# Patient Record
Sex: FEMALE | Race: WHITE | HISPANIC OR LATINO | Employment: FULL TIME | URBAN - METROPOLITAN AREA
[De-identification: names, ages, dates, MRNs, and addresses within clinical notes are randomized per-mention and may not be internally consistent; named-entity substitution may affect disease eponyms.]

---

## 2018-04-02 LAB
ABSOL LYMPHOCYTES (HISTORICAL): 3.5 K/UL (ref 0.5–4)
BASOPHILS # BLD AUTO: 0.1 K/UL (ref 0–0.1)
BASOPHILS # BLD AUTO: 1 % (ref 0–1)
COMMENT (HISTORICAL): ABNORMAL
DEPRECATED RDW RBC AUTO: 15.8 %
EOSINOPHIL # BLD AUTO: 0.1 K/UL (ref 0–0.4)
EOSINOPHIL NFR BLD AUTO: 1 % (ref 0–6)
ERYTHROCYTE SEDIMENTATION RATE (HISTORICAL): 65 MM (ref 1–20)
FOLATE SERPL-MCNC: 17.9 NG/ML
HCT VFR BLD AUTO: 36.2 % (ref 36–46)
HGB BLD-MCNC: 11.6 G/DL (ref 12–16)
LYMPHOCYTES NFR BLD AUTO: 36 % (ref 25–45)
MCH RBC QN AUTO: 21.8 PG (ref 26–34)
MCHC RBC AUTO-ENTMCNC: 31.9 % (ref 31–36)
MCV RBC AUTO: 68 FL (ref 80–100)
MONOCYTES # BLD AUTO: 0.6 K/UL (ref 0.2–0.9)
MONOCYTES NFR BLD AUTO: 6 % (ref 1–10)
NEUTROPHILS ABS COUNT (HISTORICAL): 5.4 K/UL (ref 1.8–7.8)
NEUTS SEG NFR BLD AUTO: 56 % (ref 45–65)
PLATELET # BLD AUTO: 325 K/MCL (ref 150–450)
RBC # BLD AUTO: 5.29 M/MCL (ref 4–5.2)
RBC MORPHOLOGY (HISTORICAL): ABNORMAL
VIT B12 SERPL-MCNC: 429 PG/ML (ref 239–931)
WBC # BLD AUTO: 9.7 K/MCL (ref 4.5–11)

## 2018-04-04 LAB — RHEUMATOID FACTOR (HISTORICAL): <8.6 IU/ML

## 2018-08-26 PROBLEM — E66.09 CLASS 2 OBESITY DUE TO EXCESS CALORIES WITHOUT SERIOUS COMORBIDITY WITH BODY MASS INDEX (BMI) OF 38.0 TO 38.9 IN ADULT: Status: ACTIVE | Noted: 2018-08-26

## 2018-08-26 NOTE — ASSESSMENT & PLAN NOTE
210 to 202 since previous appointment  Patient counseled on benefits of healthy diet and exercise  Educated on comorbidities and risk factors of obesity

## 2018-08-27 ENCOUNTER — OFFICE VISIT (OUTPATIENT)
Dept: FAMILY MEDICINE CLINIC | Facility: CLINIC | Age: 28
End: 2018-08-27
Payer: COMMERCIAL

## 2018-08-27 VITALS
DIASTOLIC BLOOD PRESSURE: 82 MMHG | HEIGHT: 61 IN | RESPIRATION RATE: 16 BRPM | TEMPERATURE: 96.7 F | WEIGHT: 202 LBS | OXYGEN SATURATION: 99 % | BODY MASS INDEX: 38.14 KG/M2 | SYSTOLIC BLOOD PRESSURE: 126 MMHG | HEART RATE: 85 BPM

## 2018-08-27 DIAGNOSIS — M62.838 MUSCLE SPASM: ICD-10-CM

## 2018-08-27 DIAGNOSIS — E66.09 CLASS 2 OBESITY DUE TO EXCESS CALORIES WITHOUT SERIOUS COMORBIDITY WITH BODY MASS INDEX (BMI) OF 38.0 TO 38.9 IN ADULT: ICD-10-CM

## 2018-08-27 DIAGNOSIS — Z23 ENCOUNTER FOR IMMUNIZATION: ICD-10-CM

## 2018-08-27 DIAGNOSIS — G43.119 INTRACTABLE MIGRAINE WITH AURA WITHOUT STATUS MIGRAINOSUS: Primary | ICD-10-CM

## 2018-08-27 PROCEDURE — 99213 OFFICE O/P EST LOW 20 MIN: CPT | Performed by: FAMILY MEDICINE

## 2018-08-27 PROCEDURE — 90714 TD VACC NO PRESV 7 YRS+ IM: CPT | Performed by: FAMILY MEDICINE

## 2018-08-27 PROCEDURE — 90471 IMMUNIZATION ADMIN: CPT | Performed by: FAMILY MEDICINE

## 2018-08-27 PROCEDURE — 3008F BODY MASS INDEX DOCD: CPT | Performed by: FAMILY MEDICINE

## 2018-08-27 PROCEDURE — 3725F SCREEN DEPRESSION PERFORMED: CPT | Performed by: FAMILY MEDICINE

## 2018-08-27 RX ORDER — METHOCARBAMOL 500 MG/1
500 TABLET, FILM COATED ORAL 4 TIMES DAILY PRN
Qty: 120 TABLET | Refills: 0 | Status: SHIPPED | OUTPATIENT
Start: 2018-08-27

## 2018-08-27 RX ORDER — SUMATRIPTAN 25 MG/1
25 TABLET, FILM COATED ORAL DAILY PRN
Qty: 30 TABLET | Refills: 0 | Status: SHIPPED | OUTPATIENT
Start: 2018-08-27 | End: 2018-10-22 | Stop reason: DRUGHIGH

## 2018-08-27 RX ORDER — SUMATRIPTAN 25 MG/1
25 TABLET, FILM COATED ORAL DAILY PRN
COMMUNITY
Start: 2018-04-02 | End: 2018-08-27 | Stop reason: SDUPTHER

## 2018-08-27 RX ORDER — VERAPAMIL HYDROCHLORIDE 80 MG/1
80 TABLET ORAL 3 TIMES DAILY
Qty: 90 TABLET | Refills: 1 | Status: SHIPPED | OUTPATIENT
Start: 2018-08-27

## 2018-08-27 NOTE — PROGRESS NOTES
Assessment/Plan:    Class 2 obesity due to excess calories without serious comorbidity with body mass index (BMI) of 38 0 to 38 9 in adult  210 to 202 since previous appointment  Patient counseled on benefits of healthy diet and exercise  Educated on comorbidities and risk factors of obesity     Intractable migraine with aura without status migrainosus  Patient brought headache journal into office, she has logged headaches on nearly a daily basis with the worst requiring imitrex 3-4 times per week  Headache is posterior and associated with aura, radiates up the back of the neck  Likely secondary to muscle spasm/tension across the shoulders  Of note, patient has large breasts that have caused chronic back and neck problems throughout her life  Will start on verapamil 80mg TID for prophylaxis       Diagnoses and all orders for this visit:    Intractable migraine with aura without status migrainosus  -     verapamil (CALAN) 80 mg tablet; Take 1 tablet (80 mg total) by mouth 3 (three) times a day  -     SUMAtriptan (IMITREX) 25 mg tablet; Take 1 tablet (25 mg total) by mouth daily as needed for migraine    Encounter for immunization  -     TD VACCINE GREATER THAN OR EQUAL TO 8YO PRESERVATIVE FREE IM    Class 2 obesity due to excess calories without serious comorbidity with body mass index (BMI) of 38 0 to 38 9 in adult    Muscle spasm  -     methocarbamol (ROBAXIN) 500 mg tablet; Take 1 tablet (500 mg total) by mouth 4 (four) times a day as needed for muscle spasms    Other orders  -     Discontinue: SUMAtriptan (IMITREX) 25 mg tablet; Take 25 mg by mouth daily as needed        Subjective:      Patient ID: Justino South is a 25 y o  female  This is a 26 yo who comes in today for follow up of her chronic and recurrent migraine headaches  She reports continued headaches on a daily basis which progress to migraines 3-4 times per week requiring her sumatriptan for successful relief    Bad headaches are still associated with visual aura, and occasional nausea and sensitivity to light and sound  Is open to being started on daily medication for prophylaxis vs just medication to abort headaches  The following portions of the patient's history were reviewed and updated as appropriate: allergies, current medications, past family history, past medical history, past social history, past surgical history and problem list     Review of Systems   Constitutional: Negative for appetite change, chills, fatigue, fever and unexpected weight change  HENT: Negative for congestion, ear pain, rhinorrhea, sinus pain, sinus pressure and sore throat  Eyes: Negative for visual disturbance  Respiratory: Negative for cough, chest tightness, shortness of breath and wheezing  Cardiovascular: Negative for chest pain, palpitations and leg swelling  Gastrointestinal: Negative for abdominal pain, constipation, diarrhea, nausea and vomiting  Genitourinary: Negative for dysuria and flank pain  Musculoskeletal: Positive for back pain and neck pain  Negative for arthralgias and myalgias  Skin: Negative for rash  Allergic/Immunologic: Negative for environmental allergies  Neurological: Negative for dizziness and light-headedness  Psychiatric/Behavioral: Negative for suicidal ideas  Objective:      /82 (BP Location: Left arm, Patient Position: Sitting, Cuff Size: Adult)   Pulse 85   Temp (!) 96 7 °F (35 9 °C) (Temporal)   Resp 16   Ht 5' 1" (1 549 m)   Wt 91 6 kg (202 lb)   SpO2 99%   BMI 38 17 kg/m²          Physical Exam   Constitutional: She is oriented to person, place, and time  She appears well-developed and well-nourished  No distress  HENT:   Head: Normocephalic and atraumatic  Eyes: Pupils are equal, round, and reactive to light  Neck: Normal range of motion  Muscular tenderness present  Cardiovascular: Normal rate and normal heart sounds  No murmur heard    Pulmonary/Chest: Effort normal  She has no wheezes  Abdominal: Soft  There is no tenderness  Musculoskeletal: Normal range of motion  She exhibits no edema or tenderness  Neurological: She is alert and oriented to person, place, and time  Skin: Skin is warm and dry  No rash noted  Psychiatric: She has a normal mood and affect   Her behavior is normal

## 2018-08-27 NOTE — PATIENT INSTRUCTIONS
Acute Headache   WHAT YOU NEED TO KNOW:   An acute headache is pain or discomfort that starts suddenly and gets worse quickly  You may have an acute headache only when you feel stress or eat certain foods  Other acute headache pain can happen every day, and sometimes several times a day  DISCHARGE INSTRUCTIONS:   Return to the emergency department if:   · You have severe pain  · You have numbness or weakness on one side of your face or body  · You have a headache that occurs after a blow to the head, a fall, or other trauma  · You have a headache, are forgetful or confused, or have trouble speaking  · You have a headache, stiff neck, and a fever  Contact your healthcare provider if:   · You have a constant headache and are vomiting  · You have a headache each day that does not get better, even after treatment  · You have changes in your headaches, or new symptoms that occur when you have a headache  · You have questions or concerns about your condition or care  Medicines: You may need any of the following:  · Prescription pain medicine  may be given  The medicine your healthcare provider recommends will depend on the kind of headaches you have  You will need to take prescription headache medicines as directed to prevent a problem called rebound headache  These headaches happen with regular use of pain relievers for headache disorders  · NSAIDs , such as ibuprofen, help decrease swelling, pain, and fever  This medicine is available with or without a doctor's order  NSAIDs can cause stomach bleeding or kidney problems in certain people  If you take blood thinner medicine, always ask your healthcare provider if NSAIDs are safe for you  Always read the medicine label and follow directions  · Acetaminophen  decreases pain and fever  It is available without a doctor's order  Ask how much to take and how often to take it  Follow directions   Read the labels of all other medicines you are using to see if they also contain acetaminophen, or ask your doctor or pharmacist  Acetaminophen can cause liver damage if not taken correctly  Do not use more than 3 grams (3,000 milligrams) total of acetaminophen in one day  · Antidepressants  may be given for some kinds of headaches  · Take your medicine as directed  Contact your healthcare provider if you think your medicine is not helping or if you have side effects  Tell him or her if you are allergic to any medicine  Keep a list of the medicines, vitamins, and herbs you take  Include the amounts, and when and why you take them  Bring the list or the pill bottles to follow-up visits  Carry your medicine list with you in case of an emergency  Manage your symptoms:   · Apply heat or ice  on the headache area  Use a heat or ice pack  For an ice pack, you can also put crushed ice in a plastic bag  Cover the pack or bag with a towel before you apply it to your skin  Ice and heat both help decrease pain, and heat also helps decrease muscle spasms  Apply heat for 20 to 30 minutes every 2 hours  Apply ice for 15 to 20 minutes every hour  Apply heat or ice for as long and for as many days as directed  You may alternate heat and ice  · Relax your muscles  Lie down in a comfortable position and close your eyes  Relax your muscles slowly  Start at your toes and work your way up your body  · Keep a record of your headaches  Write down when your headaches start and stop  Include your symptoms and what you were doing when the headache began  Record what you ate or drank for 24 hours before the headache started  Describe the pain and where it hurts  Keep track of what you did to treat your headache and if it worked  Prevent an acute headache:   · Avoid anything that triggers an acute headache  Examples include exposure to chemicals, going to high altitude, or not getting enough sleep  Create a regular sleep routine   Go to sleep at the same time and wake up at the same time each day  Do not use electronic devices before bedtime  These may trigger a headache or prevent you from sleeping well  · Do not smoke  Nicotine and other chemicals in cigarettes and cigars can trigger an acute headache or make it worse  Ask your healthcare provider for information if you currently smoke and need help to quit  E-cigarettes or smokeless tobacco still contain nicotine  Talk to your healthcare provider before you use these products  · Limit alcohol as directed  Alcohol can trigger an acute headache or make it worse  If you have cluster headaches, do not drink alcohol during an episode  For other types of headaches, ask your healthcare provider if it is safe for you to drink alcohol  Ask how much is safe for you to drink, and how often  · Exercise as directed  Exercise can reduce tension and help with headache pain  Aim for 30 minutes of physical activity on most days of the week  Your healthcare provider can help you create an exercise plan  · Eat a variety of healthy foods  Healthy foods include fruits, vegetables, low-fat dairy products, lean meats, fish, whole grains, and cooked beans  Your healthcare provider or dietitian can help you create meals plans if you need to avoid foods that trigger headaches  Follow up with your healthcare provider as directed:  Bring your headache record with you when you see your healthcare provider  Write down your questions so you remember to ask them during your visits  © 2017 [employer] Information is for End User's use only and may not be sold, redistributed or otherwise used for commercial purposes  All illustrations and images included in CareNotes® are the copyrighted property of A D [employer]  or [employer]  The above information is an  only  It is not intended as medical advice for individual conditions or treatments   Talk to your doctor, nurse or pharmacist before following any medical regimen to see if it is safe and effective for you

## 2018-08-27 NOTE — ASSESSMENT & PLAN NOTE
Patient brought headache journal into office, she has logged headaches on nearly a daily basis with the worst requiring imitrex 3-4 times per week  Headache is posterior and associated with aura, radiates up the back of the neck  Likely secondary to muscle spasm/tension across the shoulders  Of note, patient has large breasts that have caused chronic back and neck problems throughout her life  Will start on verapamil 80mg TID for prophylaxis

## 2018-09-17 ENCOUNTER — OFFICE VISIT (OUTPATIENT)
Dept: FAMILY MEDICINE CLINIC | Facility: CLINIC | Age: 28
End: 2018-09-17
Payer: COMMERCIAL

## 2018-09-17 VITALS
DIASTOLIC BLOOD PRESSURE: 80 MMHG | TEMPERATURE: 96.6 F | WEIGHT: 203 LBS | BODY MASS INDEX: 38.36 KG/M2 | OXYGEN SATURATION: 96 % | RESPIRATION RATE: 19 BRPM | SYSTOLIC BLOOD PRESSURE: 110 MMHG | HEART RATE: 86 BPM

## 2018-09-17 DIAGNOSIS — G47.00 INSOMNIA, UNSPECIFIED TYPE: ICD-10-CM

## 2018-09-17 DIAGNOSIS — G43.119 INTRACTABLE MIGRAINE WITH AURA WITHOUT STATUS MIGRAINOSUS: Primary | ICD-10-CM

## 2018-09-17 DIAGNOSIS — E66.09 CLASS 2 OBESITY DUE TO EXCESS CALORIES WITHOUT SERIOUS COMORBIDITY WITH BODY MASS INDEX (BMI) OF 38.0 TO 38.9 IN ADULT: ICD-10-CM

## 2018-09-17 PROCEDURE — 99213 OFFICE O/P EST LOW 20 MIN: CPT | Performed by: INTERNAL MEDICINE

## 2018-09-17 RX ORDER — TRAZODONE HYDROCHLORIDE 50 MG/1
50 TABLET ORAL
Qty: 30 TABLET | Refills: 1 | Status: SHIPPED | OUTPATIENT
Start: 2018-09-17

## 2018-09-17 NOTE — ASSESSMENT & PLAN NOTE
Patient reports that she had recent viral illness with nausea, cough, runny nose, and fever  Due to her viral illness, she has not been taking the verapamil for the past several days after taking 3 days consecutively when she first picked up the prescription  Patient was advised to restart her verapamil as prescribed before, 80 mg t i d    Also discussed the possibility of sleep apnea contributing to her headaches and in the absence of any previous sleep study being performed, one was ordered today  Advised patient to continue her headache journal as she restarts her verapamil and we will follow her up in 1 month

## 2018-09-17 NOTE — ASSESSMENT & PLAN NOTE
Weight has remained consistent since previous visit  Counseled on benefits of healthy diet and exercise  Educated on comorbidities and risk factors of obesity  Sleep study ordered

## 2018-09-17 NOTE — PROGRESS NOTES
Assessment/Plan:    Intractable migraine with aura without status migrainosus  Patient reports that she had recent viral illness with nausea, cough, runny nose, and fever  Due to her viral illness, she has not been taking the verapamil for the past several days after taking 3 days consecutively when she first picked up the prescription  Patient was advised to restart her verapamil as prescribed before, 80 mg t i d  Also discussed the possibility of sleep apnea contributing to her headaches and in the absence of any previous sleep study being performed, one was ordered today  Advised patient to continue her headache journal as she restarts her verapamil and we will follow her up in 1 month    Class 2 obesity due to excess calories without serious comorbidity with body mass index (BMI) of 38 0 to 38 9 in adult   Weight has remained consistent since previous visit  Counseled on benefits of healthy diet and exercise  Educated on comorbidities and risk factors of obesity  Sleep study ordered    Insomnia   Sleep study ordered  Discussed extensively the benefits of good sleep hygiene however 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  Advised patient to start with melatonin 1 mg 1-2 hours prior to anticipated sleep time  Advised no caffeine prior to bed  Advised no TV in the bedroom and no interaction with her electronic sources prior to sleep  Also prescribed trazodone 50 mg q h s  To be taken p r n  If difficulty initiating sleep persists       Diagnoses and all orders for this visit:    Intractable migraine with aura without status migrainosus    Class 2 obesity due to excess calories without serious comorbidity with body mass index (BMI) of 38 0 to 38 9 in adult    Insomnia, unspecified type  -     traZODone (DESYREL) 50 mg tablet; Take 1 tablet (50 mg total) by mouth daily at bedtime  -     Diagnostic Sleep Study;  Future          Subjective:      Patient ID: Jason Cross is a 25 y o  female  This is a 64-year-old female who comes into our office today for follow-up of her migraines  At her previous appointment she was started on verapamil 80 mg t i d  For headache prophylaxis  Unfortunately, she ended up coming down with a viral infection a few days after starting her verapamil and had to stop as it was causing her nausea while she had symptoms of her viral infection  She plans to reinitiate therapy with verapamil 80 mg t i d         The following portions of the patient's history were reviewed and updated as appropriate: allergies, current medications, past family history, past medical history, past social history, past surgical history and problem list     Review of Systems   Constitutional: Negative for appetite change, chills, fatigue, fever and unexpected weight change  HENT: Negative for congestion, ear pain, rhinorrhea, sinus pain, sinus pressure and sore throat  Eyes: Negative for visual disturbance  Respiratory: Negative for cough, chest tightness, shortness of breath and wheezing  Cardiovascular: Negative for chest pain, palpitations and leg swelling  Gastrointestinal: Positive for nausea  Negative for abdominal pain, constipation, diarrhea and vomiting  Genitourinary: Negative for dysuria and flank pain  Musculoskeletal: Negative for arthralgias, back pain, myalgias and neck pain  Skin: Negative for rash  Neurological: Positive for headaches  Negative for dizziness and light-headedness  Psychiatric/Behavioral: Negative for suicidal ideas  Objective:      /80 (BP Location: Left arm, Patient Position: Sitting, Cuff Size: Adult)   Pulse 86   Temp (!) 96 6 °F (35 9 °C) (Temporal)   Resp 19   Wt 92 1 kg (203 lb)   SpO2 96%   BMI 38 36 kg/m²          Physical Exam   Constitutional: She is oriented to person, place, and time  She appears well-developed and well-nourished  No distress     HENT:   Head: Normocephalic and atraumatic  Eyes: Pupils are equal, round, and reactive to light  Neck: Normal range of motion  Musculoskeletal: She exhibits no edema or tenderness  Neurological: She is alert and oriented to person, place, and time  Skin: Skin is warm and dry  No rash noted  Psychiatric: She has a normal mood and affect   Her behavior is normal

## 2018-09-17 NOTE — ASSESSMENT & PLAN NOTE
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

## 2018-10-09 ENCOUNTER — TELEPHONE (OUTPATIENT)
Dept: SLEEP CENTER | Facility: CLINIC | Age: 28
End: 2018-10-09

## 2018-10-09 NOTE — TELEPHONE ENCOUNTER
----- Message from Brittany Sanches DO sent at 10/8/2018  4:49 PM EDT -----  Chart reviewed  Study approved   ----- Message -----  From: Torsten Lynn  Sent: 10/8/2018   9:53 AM  To: Brittany Sanches, DO    Please review for approval or denial  Thank you     ----- Message -----  From: Brittany Sanches DO  Sent: 10/5/2018   3:45 PM  To: [facility]        ----- Message -----  From: Torsten Lynn  Sent: 10/5/2018  11:04 AM  To: Brittany Sanches, DO    This sleep study needs approval      If approved please sign and return to clerical pool  If denied please include reasons why  Also provide alternative testing if warranted  Please sign and return to clerical pool

## 2018-10-22 ENCOUNTER — OFFICE VISIT (OUTPATIENT)
Dept: FAMILY MEDICINE CLINIC | Facility: CLINIC | Age: 28
End: 2018-10-22
Payer: COMMERCIAL

## 2018-10-22 VITALS
RESPIRATION RATE: 14 BRPM | BODY MASS INDEX: 37.98 KG/M2 | SYSTOLIC BLOOD PRESSURE: 102 MMHG | TEMPERATURE: 96 F | OXYGEN SATURATION: 98 % | WEIGHT: 201 LBS | HEART RATE: 76 BPM | DIASTOLIC BLOOD PRESSURE: 78 MMHG

## 2018-10-22 DIAGNOSIS — G47.00 INSOMNIA, UNSPECIFIED TYPE: ICD-10-CM

## 2018-10-22 DIAGNOSIS — Z23 NEED FOR VACCINATION: ICD-10-CM

## 2018-10-22 DIAGNOSIS — E66.09 CLASS 2 OBESITY DUE TO EXCESS CALORIES WITHOUT SERIOUS COMORBIDITY WITH BODY MASS INDEX (BMI) OF 38.0 TO 38.9 IN ADULT: ICD-10-CM

## 2018-10-22 DIAGNOSIS — G43.119 INTRACTABLE MIGRAINE WITH AURA WITHOUT STATUS MIGRAINOSUS: Primary | ICD-10-CM

## 2018-10-22 PROCEDURE — 90686 IIV4 VACC NO PRSV 0.5 ML IM: CPT | Performed by: FAMILY MEDICINE

## 2018-10-22 PROCEDURE — 99213 OFFICE O/P EST LOW 20 MIN: CPT | Performed by: FAMILY MEDICINE

## 2018-10-22 PROCEDURE — 90471 IMMUNIZATION ADMIN: CPT | Performed by: FAMILY MEDICINE

## 2018-10-22 RX ORDER — SUMATRIPTAN 50 MG/1
50 TABLET, FILM COATED ORAL ONCE AS NEEDED
Qty: 30 TABLET | Refills: 0 | Status: SHIPPED | OUTPATIENT
Start: 2018-10-22 | End: 2018-10-24 | Stop reason: SDUPTHER

## 2018-10-22 NOTE — ASSESSMENT & PLAN NOTE
Sleep medicine contacted patient to inform her that she is scheduled for December 14th for sleep study to be performed  I still suspect that sleep apnea may be playing a role in her continued headaches, despite much improvement on her migraine medication, both verapamil and sumatriptan  Continue trazodone, advised patient she can take 100mg PRN if unable to sleep, but also advised to take sooner as she was stating that it feels like a while before it starts to work

## 2018-10-22 NOTE — ASSESSMENT & PLAN NOTE
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

## 2018-10-22 NOTE — PATIENT INSTRUCTIONS
Acute Headache   WHAT YOU NEED TO KNOW:   An acute headache is pain or discomfort that starts suddenly and gets worse quickly  You may have an acute headache only when you feel stress or eat certain foods  Other acute headache pain can happen every day, and sometimes several times a day  DISCHARGE INSTRUCTIONS:   Return to the emergency department if:   · You have severe pain  · You have numbness or weakness on one side of your face or body  · You have a headache that occurs after a blow to the head, a fall, or other trauma  · You have a headache, are forgetful or confused, or have trouble speaking  · You have a headache, stiff neck, and a fever  Contact your healthcare provider if:   · You have a constant headache and are vomiting  · You have a headache each day that does not get better, even after treatment  · You have changes in your headaches, or new symptoms that occur when you have a headache  · You have questions or concerns about your condition or care  Medicines: You may need any of the following:  · Prescription pain medicine  may be given  The medicine your healthcare provider recommends will depend on the kind of headaches you have  You will need to take prescription headache medicines as directed to prevent a problem called rebound headache  These headaches happen with regular use of pain relievers for headache disorders  · NSAIDs , such as ibuprofen, help decrease swelling, pain, and fever  This medicine is available with or without a doctor's order  NSAIDs can cause stomach bleeding or kidney problems in certain people  If you take blood thinner medicine, always ask your healthcare provider if NSAIDs are safe for you  Always read the medicine label and follow directions  · Acetaminophen  decreases pain and fever  It is available without a doctor's order  Ask how much to take and how often to take it  Follow directions   Read the labels of all other medicines you are using to see if they also contain acetaminophen, or ask your doctor or pharmacist  Acetaminophen can cause liver damage if not taken correctly  Do not use more than 3 grams (3,000 milligrams) total of acetaminophen in one day  · Antidepressants  may be given for some kinds of headaches  · Take your medicine as directed  Contact your healthcare provider if you think your medicine is not helping or if you have side effects  Tell him or her if you are allergic to any medicine  Keep a list of the medicines, vitamins, and herbs you take  Include the amounts, and when and why you take them  Bring the list or the pill bottles to follow-up visits  Carry your medicine list with you in case of an emergency  Manage your symptoms:   · Apply heat or ice  on the headache area  Use a heat or ice pack  For an ice pack, you can also put crushed ice in a plastic bag  Cover the pack or bag with a towel before you apply it to your skin  Ice and heat both help decrease pain, and heat also helps decrease muscle spasms  Apply heat for 20 to 30 minutes every 2 hours  Apply ice for 15 to 20 minutes every hour  Apply heat or ice for as long and for as many days as directed  You may alternate heat and ice  · Relax your muscles  Lie down in a comfortable position and close your eyes  Relax your muscles slowly  Start at your toes and work your way up your body  · Keep a record of your headaches  Write down when your headaches start and stop  Include your symptoms and what you were doing when the headache began  Record what you ate or drank for 24 hours before the headache started  Describe the pain and where it hurts  Keep track of what you did to treat your headache and if it worked  Prevent an acute headache:   · Avoid anything that triggers an acute headache  Examples include exposure to chemicals, going to high altitude, or not getting enough sleep  Create a regular sleep routine   Go to sleep at the same time and wake up at the same time each day  Do not use electronic devices before bedtime  These may trigger a headache or prevent you from sleeping well  · Do not smoke  Nicotine and other chemicals in cigarettes and cigars can trigger an acute headache or make it worse  Ask your healthcare provider for information if you currently smoke and need help to quit  E-cigarettes or smokeless tobacco still contain nicotine  Talk to your healthcare provider before you use these products  · Limit alcohol as directed  Alcohol can trigger an acute headache or make it worse  If you have cluster headaches, do not drink alcohol during an episode  For other types of headaches, ask your healthcare provider if it is safe for you to drink alcohol  Ask how much is safe for you to drink, and how often  · Exercise as directed  Exercise can reduce tension and help with headache pain  Aim for 30 minutes of physical activity on most days of the week  Your healthcare provider can help you create an exercise plan  · Eat a variety of healthy foods  Healthy foods include fruits, vegetables, low-fat dairy products, lean meats, fish, whole grains, and cooked beans  Your healthcare provider or dietitian can help you create meals plans if you need to avoid foods that trigger headaches  Follow up with your healthcare provider as directed:  Bring your headache record with you when you see your healthcare provider  Write down your questions so you remember to ask them during your visits  © 2017 [facility] Information is for End User's use only and may not be sold, redistributed or otherwise used for commercial purposes  All illustrations and images included in CareNotes® are the copyrighted property of [employer] M [employer]  or [employer]  The above information is an  only  It is not intended as medical advice for individual conditions or treatments   Talk to your doctor, nurse or pharmacist before following any medical regimen to see if it is safe and effective for you

## 2018-10-22 NOTE — PROGRESS NOTES
Assessment/Plan:    Class 2 obesity due to excess calories without serious comorbidity with body mass index (BMI) of 38 0 to 38 9 in adult  Patient counseled on benefits of healthy diet and exercise  Educated on comorbidities and risk factors of obesity      Insomnia  Sleep medicine contacted patient to inform her that she is scheduled for December 14th for sleep study to be performed  I still suspect that sleep apnea may be playing a role in her continued headaches, despite much improvement on her migraine medication, both verapamil and sumatriptan  Continue trazodone, advised patient she can take 100mg PRN if unable to sleep, but also advised to take sooner as she was stating that it feels like a while before it starts to work    Intractable migraine with aura without status migrainosus  Switched job, 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[employer], 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  Since being compliant with TID verapamil therapy, patient reports both improvement in frequency of her headaches and intensity when she does get her headaches  Scheduled for sleep study to be done in December  Increased Imitrex dose to 50mg and advised patient that the goal of Imitrex is complete resolution of her headache, so she may repeat the dose after 2 hours if unresolved after the first dose  Cautioned patient about using Imitrex no more than 2-3 times per week  Continue headache journal for continued tracking of headache       Diagnoses and all orders for this visit:    Intractable migraine with aura without status migrainosus  -     SUMAtriptan (IMITREX) 50 mg tablet;  Take 1 tablet (50 mg total) by mouth once as needed for migraine for up to 1 dose    Need for vaccination  -     SYRINGE/SINGLE-DOSE VIAL: influenza vaccine, 3220-7786, quadrivalent, 0 5 mL, preservative-free, for patients 3+ yr (FLUZONE)    Insomnia, unspecified type    Class 2 obesity due to excess calories without serious comorbidity with body mass index (BMI) of 38 0 to 38 9 in adult          Subjective:      Patient ID: Mark Webb is a 25 y o  female  This is a 26 yo F who comes into the clinic today for follow up of her chronic migraines  She does reports improvement in her symptoms today and states that the migraines are less frequent and less intense after being started on verapamil  No new or evolving symptoms in regards to migraine and no side effects from medication  Reports sleep study scheduled for 12/14/18  The following portions of the patient's history were reviewed and updated as appropriate: allergies, current medications, past family history, past medical history, past social history, past surgical history and problem list     Review of Systems   Constitutional: Negative for appetite change, chills, fatigue, fever and unexpected weight change  HENT: Negative for congestion, ear pain, rhinorrhea, sinus pain, sinus pressure and sore throat  Eyes: Negative for visual disturbance  Respiratory: Negative for cough, chest tightness, shortness of breath and wheezing  Cardiovascular: Negative for chest pain, palpitations and leg swelling  Gastrointestinal: Negative for abdominal pain, constipation, diarrhea, nausea and vomiting  Genitourinary: Negative for dysuria and flank pain  Musculoskeletal: Negative for arthralgias, back pain, myalgias and neck pain  Skin: Negative for rash  Allergic/Immunologic: Negative for environmental allergies  Neurological: Positive for headaches  Negative for dizziness and light-headedness  Psychiatric/Behavioral: Negative for suicidal ideas  Objective:      /78 (BP Location: Left arm, Patient Position: Sitting, Cuff Size: Standard)   Pulse 76   Temp (!) 96 °F (35 6 °C)   Resp 14   Wt 91 2 kg (201 lb)   SpO2 98%   BMI 37 98 kg/m²          Physical Exam   Constitutional: She is oriented to person, place, and time  She appears well-developed and well-nourished  No distress     HENT:   Head: Normocephalic and atraumatic  Eyes: Pupils are equal, round, and reactive to light  Neck: Normal range of motion  Cardiovascular: Normal rate and normal heart sounds  No murmur heard  Pulmonary/Chest: Effort normal  She has no wheezes  Abdominal: Soft  There is no tenderness  Musculoskeletal: Normal range of motion  She exhibits no edema or tenderness  Neurological: She is alert and oriented to person, place, and time  Skin: Skin is warm and dry  No rash noted  Psychiatric: She has a normal mood and affect   Her behavior is normal

## 2018-10-22 NOTE — ASSESSMENT & PLAN NOTE
Patient counseled on benefits of healthy diet and exercise  Educated on comorbidities and risk factors of obesity

## 2018-10-24 DIAGNOSIS — G43.119 INTRACTABLE MIGRAINE WITH AURA WITHOUT STATUS MIGRAINOSUS: ICD-10-CM

## 2018-10-24 NOTE — TELEPHONE ENCOUNTER
Received prior auth request for imitrex  It's covered but they only allow 12 pills per 30 days so I've changed that to reflect but if pt is requesting refill too early then that's probably why   But we'll start by changing the quantity to see if that works first

## 2018-10-25 RX ORDER — SUMATRIPTAN 50 MG/1
50 TABLET, FILM COATED ORAL ONCE AS NEEDED
Qty: 12 TABLET | Refills: 0 | Status: SHIPPED | OUTPATIENT
Start: 2018-10-25

## 2018-10-26 ENCOUNTER — ANNUAL EXAM (OUTPATIENT)
Dept: FAMILY MEDICINE CLINIC | Facility: CLINIC | Age: 28
End: 2018-10-26
Payer: COMMERCIAL

## 2018-10-26 ENCOUNTER — LAB REQUISITION (OUTPATIENT)
Dept: LAB | Facility: HOSPITAL | Age: 28
End: 2018-10-26
Payer: COMMERCIAL

## 2018-10-26 VITALS
RESPIRATION RATE: 16 BRPM | BODY MASS INDEX: 38.17 KG/M2 | WEIGHT: 202 LBS | OXYGEN SATURATION: 98 % | DIASTOLIC BLOOD PRESSURE: 82 MMHG | TEMPERATURE: 97 F | SYSTOLIC BLOOD PRESSURE: 120 MMHG | HEART RATE: 94 BPM

## 2018-10-26 DIAGNOSIS — Z01.419 ENCOUNTER FOR GYNECOLOGICAL EXAMINATION WITHOUT ABNORMAL FINDING: ICD-10-CM

## 2018-10-26 DIAGNOSIS — Z12.4 ENCOUNTER FOR SCREENING FOR MALIGNANT NEOPLASM OF CERVIX: ICD-10-CM

## 2018-10-26 DIAGNOSIS — Z12.4 SCREENING FOR MALIGNANT NEOPLASM OF CERVIX: ICD-10-CM

## 2018-10-26 PROCEDURE — 87624 HPV HI-RISK TYP POOLED RSLT: CPT | Performed by: FAMILY MEDICINE

## 2018-10-26 PROCEDURE — G0145 SCR C/V CYTO,THINLAYER,RESCR: HCPCS | Performed by: FAMILY MEDICINE

## 2018-10-26 PROCEDURE — 87591 N.GONORRHOEAE DNA AMP PROB: CPT | Performed by: FAMILY MEDICINE

## 2018-10-26 PROCEDURE — G0124 SCREEN C/V THIN LAYER BY MD: HCPCS | Performed by: FAMILY MEDICINE

## 2018-10-26 PROCEDURE — 87491 CHLMYD TRACH DNA AMP PROBE: CPT | Performed by: FAMILY MEDICINE

## 2018-10-26 PROCEDURE — 99395 PREV VISIT EST AGE 18-39: CPT | Performed by: FAMILY MEDICINE

## 2018-10-26 NOTE — PROGRESS NOTES
Assessment/Plan:      Diagnoses and all orders for this visit:    Encounter for gynecological examination without abnormal finding  -     Liquid-based pap, screening  -     GC culture    Screening for malignant neoplasm of cervix  -     GC culture          Subjective:     Patient ID: Chaim Espino is a 25 y o  female  This is a 24 yo F who comes in today for routine gynecologic exam  Last gyn exam was 13 years ago according to the patient  LMP was last week  Patient is currently sexually active  Gynecologic Exam   The patient's pertinent negatives include no genital itching, genital lesions, genital odor, genital rash, pelvic pain, vaginal bleeding or vaginal discharge  She is not pregnant  Pertinent negatives include no dysuria, fever or frequency  Her menstrual history has been regular  Review of Systems   Constitutional: Negative for fatigue and fever  Genitourinary: Negative for difficulty urinating, dysuria, frequency, menstrual problem, pelvic pain, vaginal bleeding, vaginal discharge and vaginal pain  Objective:     Physical Exam   Constitutional: She appears well-developed and well-nourished  No distress  Genitourinary: Vagina normal and uterus normal  Pelvic exam was performed with patient supine  There is no rash, tenderness or lesion on the right labia  There is no rash, tenderness or lesion on the left labia  Cervix exhibits discharge  Cervix exhibits no motion tenderness and no friability  Right adnexum displays no mass and no tenderness  Left adnexum displays no mass and no tenderness

## 2018-10-30 LAB
CHLAMYDIA DNA CVX QL NAA+PROBE: NORMAL
N GONORRHOEA DNA GENITAL QL NAA+PROBE: NORMAL

## 2018-10-31 LAB
HPV HR 12 DNA CVX QL NAA+PROBE: NEGATIVE
HPV16 DNA CVX QL NAA+PROBE: NEGATIVE
HPV18 DNA CVX QL NAA+PROBE: POSITIVE

## 2018-11-01 LAB
LAB AP GYN PRIMARY INTERPRETATION: NORMAL
LAB AP LMP: NORMAL
Lab: NORMAL

## 2018-11-17 ENCOUNTER — APPOINTMENT (EMERGENCY)
Dept: RADIOLOGY | Facility: HOSPITAL | Age: 28
End: 2018-11-17
Payer: COMMERCIAL

## 2018-11-17 ENCOUNTER — HOSPITAL ENCOUNTER (EMERGENCY)
Facility: HOSPITAL | Age: 28
Discharge: HOME | End: 2018-11-17
Attending: EMERGENCY MEDICINE | Admitting: EMERGENCY MEDICINE
Payer: COMMERCIAL

## 2018-11-17 VITALS
RESPIRATION RATE: 16 BRPM | TEMPERATURE: 97.2 F | HEART RATE: 87 BPM | WEIGHT: 204.81 LBS | DIASTOLIC BLOOD PRESSURE: 74 MMHG | SYSTOLIC BLOOD PRESSURE: 141 MMHG | OXYGEN SATURATION: 100 % | BODY MASS INDEX: 38.7 KG/M2

## 2018-11-17 DIAGNOSIS — S63.509A WRIST SPRAIN: Primary | ICD-10-CM

## 2018-11-17 PROCEDURE — 73110 X-RAY EXAM OF WRIST: CPT

## 2018-11-17 PROCEDURE — 99283 EMERGENCY DEPT VISIT LOW MDM: CPT

## 2018-11-17 RX ORDER — IBUPROFEN 600 MG/1
600 TABLET ORAL EVERY 6 HOURS PRN
Qty: 30 TABLET | Refills: 0 | Status: SHIPPED | OUTPATIENT
Start: 2018-11-17

## 2018-11-17 RX ORDER — ACETAMINOPHEN 325 MG/1
650 TABLET ORAL ONCE
Status: COMPLETED | OUTPATIENT
Start: 2018-11-17 | End: 2018-11-17

## 2018-11-17 RX ADMIN — ACETAMINOPHEN 650 MG: 325 TABLET ORAL at 14:49

## 2018-11-17 NOTE — DISCHARGE INSTRUCTIONS
Sprain   WHAT YOU NEED TO KNOW:   A sprain happens when a ligament is stretched or torn  Ligaments are tough tissues that connect bones  Ligaments support your joints and keep your bones in place  They allow you to lift, lower, or rotate your arms and legs  A sprain may involve one or more ligaments  DISCHARGE INSTRUCTIONS:   Return to the emergency department if:   · You have numbness or tingling below the injury, such as in your fingers or toes  · The skin over your sprained area is blue or pale  · Your pain has increased or returned, even after you take pain medicine  Contact your healthcare provider if:   · Your symptoms do not better  · Your swelling has increased or returned  · Your joint becomes more weak or unstable  · You have questions or concerns about your condition or care  Medicines:   · Prescription pain medicine  may be given  Ask how to take this medicine safely  · Acetaminophen  decreases pain and fever  It is available without a doctor's order  Ask how much to take and how often to take it  Follow directions  Acetaminophen can cause liver damage if not taken correctly  · NSAIDs , such as ibuprofen, help decrease swelling, pain, and fever  This medicine is available with or without a doctor's order  NSAIDs can cause stomach bleeding or kidney problems in certain people  If you take blood thinner medicine, always ask your healthcare provider if NSAIDs are safe for you  Always read the medicine label and follow directions  · Take your medicine as directed  Contact your healthcare provider if you think your medicine is not helping or if you have side effects  Tell him or her if you are allergic to any medicine  Keep a list of the medicines, vitamins, and herbs you take  Include the amounts, and when and why you take them  Bring the list or the pill bottles to follow-up visits  Carry your medicine list with you in case of an emergency    Support devices:  Support services, such as an elastic bandage, splint, brace, or cast may be needed  These devices limit your movement and protect your joint  You may need to use crutches if the sprain is in your leg  This will help decrease your pain as you move around  Self-care:   · Rest  your joint so that it can heal  Return to normal activities as directed  · Apply ice  on your injury for 15 to 20 minutes every hour or as directed  Use an ice pack, or put crushed ice in a plastic bag  Cover it with a towel  Ice helps prevent tissue damage and decreases swelling and pain  · Compress  the injured area as directed  Ask your healthcare provider if you should wrap an elastic bandage around your injured ligament  An elastic bandage provides support and helps decrease swelling and movement so your joint can heal      · Elevate  the injured area above the level of your heart as often as you can  This will help decrease swelling and pain  Prop the injured area on pillows or blankets to keep it elevated comfortably  Physical therapy:  A physical therapist teaches you exercises to help improve movement and strength, and to decrease pain  Prevent another sprain:  Regular exercise can strengthen your muscles and help prevent another injury  Do the following before you begin or return to regular exercise or sports training:  · Ask your healthcare provider about the activities you can do  Find out how long your ligament needs to heal  Do not do any physical activity until your healthcare provider says it is okay  If you start activity too soon, you may develop a more serious injury  · Always warm up and stretch  before your regular exercise, sport, or physical activity  · Take it slow  Slowly increase how often and how long you exercise or train  Sudden increases in how often you train may cause you to overstretch or tear your ligament  · Use the right equipment    Always wear shoes that fit well and are made for the activity that you are doing  You may also use ankle supports, elbow and knee pads, or braces  Follow up with your healthcare provider as directed:  Write down your questions so you remember to ask them during your visits  © 2017 [facility] Information is for End User's use only and may not be sold, redistributed or otherwise used for commercial purposes  All illustrations and images included in CareNotes® are the copyrighted property of [employer]  or [employer]  The above information is an  only  It is not intended as medical advice for individual conditions or treatments  Talk to your doctor, nurse or pharmacist before following any medical regimen to see if it is safe and effective for you

## 2019-11-06 ENCOUNTER — HOSPITAL ENCOUNTER (EMERGENCY)
Facility: HOSPITAL | Age: 29
Discharge: HOME | End: 2019-11-06
Attending: EMERGENCY MEDICINE | Admitting: EMERGENCY MEDICINE

## 2019-11-06 ENCOUNTER — APPOINTMENT (EMERGENCY)
Dept: RADIOLOGY | Facility: HOSPITAL | Age: 29
End: 2019-11-06

## 2019-11-06 VITALS
HEART RATE: 87 BPM | SYSTOLIC BLOOD PRESSURE: 127 MMHG | DIASTOLIC BLOOD PRESSURE: 89 MMHG | OXYGEN SATURATION: 96 % | RESPIRATION RATE: 16 BRPM | BODY MASS INDEX: 36.56 KG/M2 | WEIGHT: 193.5 LBS | TEMPERATURE: 97.5 F

## 2019-11-06 DIAGNOSIS — R19.7 DIARRHEA: ICD-10-CM

## 2019-11-06 DIAGNOSIS — J18.9 PNEUMONIA: Primary | ICD-10-CM

## 2019-11-06 DIAGNOSIS — R11.2 NAUSEA AND VOMITING: ICD-10-CM

## 2019-11-06 LAB
ALBUMIN SERPL BCP-MCNC: 4.5 G/DL (ref 3–5.2)
ALP SERPL-CCNC: 87 U/L (ref 43–122)
ALT SERPL W P-5'-P-CCNC: 34 U/L (ref 9–52)
ANION GAP SERPL CALCULATED.3IONS-SCNC: 9 MMOL/L (ref 5–14)
AST SERPL W P-5'-P-CCNC: 30 U/L (ref 14–36)
BACTERIA UR QL AUTO: ABNORMAL /HPF
BASOPHILS # BLD AUTO: 0.1 THOUSANDS/ΜL (ref 0–0.1)
BASOPHILS NFR BLD AUTO: 1 % (ref 0–1)
BILIRUB SERPL-MCNC: 0.5 MG/DL
BILIRUB UR QL STRIP: NEGATIVE
BUN SERPL-MCNC: 12 MG/DL (ref 5–25)
CALCIUM SERPL-MCNC: 9.5 MG/DL (ref 8.4–10.2)
CHLORIDE SERPL-SCNC: 98 MMOL/L (ref 97–108)
CLARITY UR: CLEAR
CO2 SERPL-SCNC: 30 MMOL/L (ref 22–30)
COLOR UR: ABNORMAL
CREAT SERPL-MCNC: 0.67 MG/DL (ref 0.6–1.2)
EOSINOPHIL # BLD AUTO: 0.4 THOUSAND/ΜL (ref 0–0.4)
EOSINOPHIL NFR BLD AUTO: 3 % (ref 0–6)
ERYTHROCYTE [DISTWIDTH] IN BLOOD BY AUTOMATED COUNT: 15 %
EXT PREG TEST URINE: NEGATIVE
EXT. CONTROL ED NAV: NORMAL
GFR SERPL CREATININE-BSD FRML MDRD: 118 ML/MIN/1.73SQ M
GLUCOSE SERPL-MCNC: 102 MG/DL (ref 70–99)
GLUCOSE UR STRIP-MCNC: NEGATIVE MG/DL
HCT VFR BLD AUTO: 39.8 % (ref 36–46)
HGB BLD-MCNC: 12.7 G/DL (ref 12–16)
HGB UR QL STRIP.AUTO: 25
KETONES UR STRIP-MCNC: ABNORMAL MG/DL
LEUKOCYTE ESTERASE UR QL STRIP: 25
LIPASE SERPL-CCNC: 81 U/L (ref 23–300)
LYMPHOCYTES # BLD AUTO: 3.2 THOUSANDS/ΜL (ref 0.5–4)
LYMPHOCYTES NFR BLD AUTO: 28 % (ref 25–45)
MCH RBC QN AUTO: 21.6 PG (ref 26–34)
MCHC RBC AUTO-ENTMCNC: 31.8 G/DL (ref 31–36)
MCV RBC AUTO: 68 FL (ref 80–100)
MICROCYTES BLD QL AUTO: PRESENT
MONOCYTES # BLD AUTO: 1 THOUSAND/ΜL (ref 0.2–0.9)
MONOCYTES NFR BLD AUTO: 9 % (ref 1–10)
MUCOUS THREADS UR QL AUTO: ABNORMAL
NEUTROPHILS # BLD AUTO: 6.8 THOUSANDS/ΜL (ref 1.8–7.8)
NEUTS SEG NFR BLD AUTO: 59 % (ref 45–65)
NITRITE UR QL STRIP: NEGATIVE
NON-SQ EPI CELLS URNS QL MICRO: ABNORMAL /HPF
PH UR STRIP.AUTO: 5 [PH]
PLATELET # BLD AUTO: 379 THOUSANDS/UL (ref 150–450)
PLATELET BLD QL SMEAR: ADEQUATE
PMV BLD AUTO: 8.7 FL (ref 8.9–12.7)
POTASSIUM SERPL-SCNC: 3.6 MMOL/L (ref 3.6–5)
PROT SERPL-MCNC: 8.9 G/DL (ref 5.9–8.4)
PROT UR STRIP-MCNC: ABNORMAL MG/DL
RBC # BLD AUTO: 5.86 MILLION/UL (ref 4–5.2)
RBC #/AREA URNS AUTO: ABNORMAL /HPF
RBC MORPH BLD: NORMAL
SODIUM SERPL-SCNC: 137 MMOL/L (ref 137–147)
SP GR UR STRIP.AUTO: 1.02 (ref 1–1.04)
UROBILINOGEN UA: NEGATIVE MG/DL
WBC # BLD AUTO: 11.4 THOUSAND/UL (ref 4.5–11)
WBC #/AREA URNS AUTO: ABNORMAL /HPF

## 2019-11-06 PROCEDURE — 81001 URINALYSIS AUTO W/SCOPE: CPT | Performed by: PHYSICIAN ASSISTANT

## 2019-11-06 PROCEDURE — 83690 ASSAY OF LIPASE: CPT | Performed by: PHYSICIAN ASSISTANT

## 2019-11-06 PROCEDURE — 96375 TX/PRO/DX INJ NEW DRUG ADDON: CPT

## 2019-11-06 PROCEDURE — 99285 EMERGENCY DEPT VISIT HI MDM: CPT | Performed by: PHYSICIAN ASSISTANT

## 2019-11-06 PROCEDURE — 36415 COLL VENOUS BLD VENIPUNCTURE: CPT | Performed by: PHYSICIAN ASSISTANT

## 2019-11-06 PROCEDURE — 96366 THER/PROPH/DIAG IV INF ADDON: CPT

## 2019-11-06 PROCEDURE — 99284 EMERGENCY DEPT VISIT MOD MDM: CPT

## 2019-11-06 PROCEDURE — 81025 URINE PREGNANCY TEST: CPT | Performed by: PHYSICIAN ASSISTANT

## 2019-11-06 PROCEDURE — 81003 URINALYSIS AUTO W/O SCOPE: CPT | Performed by: PHYSICIAN ASSISTANT

## 2019-11-06 PROCEDURE — 85025 COMPLETE CBC W/AUTO DIFF WBC: CPT | Performed by: PHYSICIAN ASSISTANT

## 2019-11-06 PROCEDURE — 71046 X-RAY EXAM CHEST 2 VIEWS: CPT

## 2019-11-06 PROCEDURE — 96361 HYDRATE IV INFUSION ADD-ON: CPT

## 2019-11-06 PROCEDURE — 80053 COMPREHEN METABOLIC PANEL: CPT | Performed by: PHYSICIAN ASSISTANT

## 2019-11-06 PROCEDURE — 96365 THER/PROPH/DIAG IV INF INIT: CPT

## 2019-11-06 RX ORDER — ONDANSETRON 4 MG/1
4 TABLET, ORALLY DISINTEGRATING ORAL EVERY 6 HOURS PRN
Qty: 20 TABLET | Refills: 0 | Status: SHIPPED | OUTPATIENT
Start: 2019-11-06

## 2019-11-06 RX ORDER — METOCLOPRAMIDE HYDROCHLORIDE 5 MG/ML
10 INJECTION INTRAMUSCULAR; INTRAVENOUS ONCE
Status: COMPLETED | OUTPATIENT
Start: 2019-11-06 | End: 2019-11-06

## 2019-11-06 RX ORDER — PROMETHAZINE HYDROCHLORIDE AND CODEINE PHOSPHATE 6.25; 1 MG/5ML; MG/5ML
5 SYRUP ORAL EVERY 4 HOURS PRN
Qty: 120 ML | Refills: 0 | Status: SHIPPED | OUTPATIENT
Start: 2019-11-06 | End: 2019-11-16

## 2019-11-06 RX ORDER — ALBUTEROL SULFATE 90 UG/1
2 AEROSOL, METERED RESPIRATORY (INHALATION) EVERY 4 HOURS PRN
Qty: 1 INHALER | Refills: 0 | Status: SHIPPED | OUTPATIENT
Start: 2019-11-06

## 2019-11-06 RX ORDER — ONDANSETRON 2 MG/ML
4 INJECTION INTRAMUSCULAR; INTRAVENOUS ONCE
Status: COMPLETED | OUTPATIENT
Start: 2019-11-06 | End: 2019-11-06

## 2019-11-06 RX ORDER — DOXYCYCLINE HYCLATE 100 MG/1
100 CAPSULE ORAL 2 TIMES DAILY
Qty: 20 CAPSULE | Refills: 0 | Status: SHIPPED | OUTPATIENT
Start: 2019-11-06 | End: 2019-11-16

## 2019-11-06 RX ADMIN — SODIUM CHLORIDE 1000 ML: 0.9 INJECTION, SOLUTION INTRAVENOUS at 18:11

## 2019-11-06 RX ADMIN — DOXYCYCLINE 100 MG: 100 INJECTION, POWDER, LYOPHILIZED, FOR SOLUTION INTRAVENOUS at 19:07

## 2019-11-06 RX ADMIN — ONDANSETRON 4 MG: 2 INJECTION INTRAMUSCULAR; INTRAVENOUS at 18:11

## 2019-11-06 RX ADMIN — METOCLOPRAMIDE 10 MG: 5 INJECTION, SOLUTION INTRAMUSCULAR; INTRAVENOUS at 19:08

## 2019-11-07 NOTE — ED PROVIDER NOTES
History  Chief Complaint   Patient presents with    Vomiting     Vomiting since saturday with diarrhea  Patient reports she felt hot and was flushed but didn't check her temperature  Using theraflu and robitussin   Cough     some spot of blood noticed on phlegm  31 yo F with PMH migraine presenting for evaluation of cough, vomiting and diarrhea  Pt reports symptom onset approximately 1 week ago  Patient reports nausea multiple episodes of nonbilious nonbloody vomiting along with diarrhea  Denies any melena or hematochezia  She reports productive cough with with intermittent episodes of blood streaked sputum  She reports subjective chills and sweats but denies any fevers  She states she has been hungry and wanting to eat however cannot keep anything down  States she has only tried Pepto-Bismol for the diarrhea at home  No other over-the-counter cough and cold medication  She states that co-worker was recently diagnosed pneumonia  Prior to Admission Medications   Prescriptions Last Dose Informant Patient Reported? Taking? SUMAtriptan (IMITREX) 50 mg tablet   No No   Sig: Take 1 tablet (50 mg total) by mouth once as needed for migraine for up to 1 dose   ibuprofen (MOTRIN) 600 mg tablet   No No   Sig: Take 1 tablet (600 mg total) by mouth every 6 (six) hours as needed (pain)   methocarbamol (ROBAXIN) 500 mg tablet  Self No No   Sig: Take 1 tablet (500 mg total) by mouth 4 (four) times a day as needed for muscle spasms   traZODone (DESYREL) 50 mg tablet  Self No No   Sig: Take 1 tablet (50 mg total) by mouth daily at bedtime   verapamil (CALAN) 80 mg tablet  Self No No   Sig: Take 1 tablet (80 mg total) by mouth 3 (three) times a day      Facility-Administered Medications: None       Past Medical History:   Diagnosis Date    Migraine        Past Surgical History:   Procedure Laterality Date     SECTION         History reviewed  No pertinent family history    I have reviewed and agree with the history as documented  Social History     Tobacco Use    Smoking status: Never Smoker    Smokeless tobacco: Never Used   Substance Use Topics    Alcohol use: Yes    Drug use: No        Review of Systems   All other systems reviewed and are negative  Physical Exam  Physical Exam   Constitutional: She is oriented to person, place, and time  She appears well-nourished  No distress  overweight   HENT:   Head: Normocephalic and atraumatic  Right Ear: External ear normal    Left Ear: External ear normal    Mouth/Throat: Oropharynx is clear and moist    Eyes: Conjunctivae are normal    EOM grossly intact   Neck: Normal range of motion  Neck supple  No JVD present  Cardiovascular: Normal rate  Exam reveals no friction rub  No murmur heard  Pulmonary/Chest: Effort normal  No stridor  No respiratory distress  She has wheezes (expiratory L lower base)  She has no rales  Abdominal: Soft  Bowel sounds are normal  She exhibits no distension  There is no tenderness  There is no guarding  Musculoskeletal:   FROM, steady gait, cap refill brisk, strength and sensation grossly intact throughout   Lymphadenopathy:     She has no cervical adenopathy  Neurological: She is alert and oriented to person, place, and time  Skin: Skin is warm and dry  Capillary refill takes less than 2 seconds  She is not diaphoretic  Psychiatric: She has a normal mood and affect  Her behavior is normal    Nursing note and vitals reviewed        Vital Signs  ED Triage Vitals   Temperature Pulse Respirations Blood Pressure SpO2   11/06/19 1722 11/06/19 1722 11/06/19 1722 11/06/19 1722 11/06/19 1722   97 5 °F (36 4 °C) 94 18 148/81 96 %      Temp Source Heart Rate Source Patient Position - Orthostatic VS BP Location FiO2 (%)   11/06/19 1722 11/06/19 1935 11/06/19 1722 11/06/19 1722 --   Tympanic Monitor Sitting Left arm       Pain Score       11/06/19 1722       No Pain           Vitals:    11/06/19 1722 11/06/19 1935   BP: 148/81 127/89   Pulse: 94 87   Patient Position - Orthostatic VS: Sitting Lying         Visual Acuity      ED Medications  Medications   sodium chloride 0 9 % bolus 1,000 mL (0 mL Intravenous Stopped 11/6/19 1913)   ondansetron (ZOFRAN) injection 4 mg (4 mg Intravenous Given 11/6/19 1811)   metoclopramide (REGLAN) injection 10 mg (10 mg Intravenous Given 11/6/19 1908)   doxycycline (VIBRAMYCIN) 100 mg in sodium chloride 0 9 % 100 mL IVPB (0 mg Intravenous Stopped 11/6/19 2042)       Diagnostic Studies  Results Reviewed     Procedure Component Value Units Date/Time    Urine Microscopic [[id number]]  (Abnormal) Collected:  11/06/19 1758    Lab Status:  Final result Specimen:  Urine, Other Updated:  11/06/19 1857     RBC, UA 0-1 /hpf      WBC, UA 0-1 /hpf      Epithelial Cells Moderate /hpf      Bacteria, UA Moderate /hpf      MUCUS THREADS Moderate    UA w Reflex to Microscopic w Reflex to Culture [[id number]]  (Abnormal) Collected:  11/06/19 1758    Lab Status:  Final result Specimen:  Urine, Other Updated:  11/06/19 1844     Color, UA Melanie     Clarity, UA Clear     Specific [place], UA 1 020     pH, UA 5 0     Leukocytes, UA 25 0     Nitrite, UA Negative     Protein, UA 15 (Trace) mg/dl      Glucose, UA Negative mg/dl      Ketones, UA 50 (2+) mg/dl      Bilirubin, UA Negative     Blood, UA 25 0     UROBILINOGEN UA Negative mg/dL     Lipase [[id number]]  (Normal) Collected:  11/06/19 1759    Lab Status:  Final result Specimen:  Blood from Arm, Right Updated:  11/06/19 1840     Lipase 81 u/L     Comprehensive metabolic panel [[id number]]  (Abnormal) Collected:  11/06/19 1759    Lab Status:  Final result Specimen:  Blood from Arm, Right Updated:  11/06/19 1840     Sodium 137 mmol/L      Potassium 3 6 mmol/L      Chloride 98 mmol/L      CO2 30 mmol/L      ANION GAP 9 mmol/L      BUN 12 mg/dL      Creatinine 0 67 mg/dL      Glucose 102 mg/dL      Calcium 9 5 mg/dL      AST 30 U/L      ALT 34 U/L      Alkaline Phosphatase 87 U/L      Total Protein 8 9 g/dL      Albumin 4 5 g/dL      Total Bilirubin 0 50 mg/dL      eGFR 118 ml/min/1 73sq m     Narrative:       [place] guidelines for Chronic Kidney Disease (CKD):     Stage 1 with normal or high GFR (GFR > 90 mL/min/1 73 square meters)    Stage 2 Mild CKD (GFR = 60-89 mL/min/1 73 square meters)    Stage 3A Moderate CKD (GFR = 45-59 mL/min/1 73 square meters)    Stage 3B Moderate CKD (GFR = 30-44 mL/min/1 73 square meters)    Stage 4 Severe CKD (GFR = 15-29 mL/min/1 73 square meters)    Stage 5 End Stage CKD (GFR <15 mL/min/1 73 square meters)  Note: GFR calculation is accurate only with a steady state creatinine    CBC and differential [[id number]]  (Abnormal) Collected:  11/06/19 1759    Lab Status:  Final result Specimen:  Blood from Arm, Right Updated:  11/06/19 1835     WBC 11 40 Thousand/uL      RBC 5 86 Million/uL      Hemoglobin 12 7 g/dL      Hematocrit 39 8 %      MCV 68 fL      MCH 21 6 pg      MCHC 31 8 g/dL      RDW 15 0 %      MPV 8 7 fL      Platelets 501 Thousands/uL      Neutrophils Relative 59 %      Lymphocytes Relative 28 %      Monocytes Relative 9 %      Eosinophils Relative 3 %      Basophils Relative 1 %      Neutrophils Absolute 6 80 Thousands/µL      Lymphocytes Absolute 3 20 Thousands/µL      Monocytes Absolute 1 00 Thousand/µL      Eosinophils Absolute 0 40 Thousand/µL      Basophils Absolute 0 10 Thousands/µL     POCT pregnancy, urine [[id number]]  (Normal) Resulted:  11/06/19 1759    Lab Status:  Final result Updated:  11/06/19 1759     EXT PREG TEST UR (Ref: Negative) negative     Control valid                 XR chest 2 views   ED Interpretation by Amy Bourgeois PA-C (11/06 1849)   L infiltrate      Final Result by Brittany Hickman DO (11/06 1905)      Left upper and lower lobe consolidative opacities compatible with pneumonia  The study was marked in [facility] for immediate notification              Workstation performed: [id number]                    Procedures  Procedures       ED Course  ED Course as of Nov 06 2226 Wed Nov 06, 2019   1853 Pt continued nausea, no episodes of vomiting in the ED      2000 Pt improving, will await doxy to complete and then likely d/c home on PO doxy                      Initial Sepsis Screening     Row Name 11/06/19 2056                Is the patient's history suggestive of a new or worsening infection? (!) Yes (Proceed)  -BG        Suspected source of infection  pneumonia  -BG        Are two or more of the following signs & symptoms of infection both present and new to the patient? No  -BG        Indicate SIRS criteria  --        If the answer is yes to both questions, suspicion of sepsis is present  --        If severe sepsis is present AND tissue hypoperfusion perists in the hour after fluid resuscitation or lactate > 4, the patient meets criteria for SEPTIC SHOCK  --        Are any of the following organ dysfunction criteria present within 6 hours of suspected infection and SIRS criteria that are NOT considered to be chronic conditions?   --        Organ dysfunction  --        Date of presentation of severe sepsis  --        Time of presentation of severe sepsis  --        Tissue hypoperfusion persists in the hour after crystalloid fluid administration, evidenced, by either:  --        Was hypotension present within one hour of the conclusion of crystalloid fluid administration?  --        Date of presentation of septic shock  --        Time of presentation of septic shock  --          User Key  (r) = Recorded By, (t) = Taken By, (c) = Cosigned By    Initials Name Provider Type    BG Molly Copeland PA-C Physician Assistant                  MDM  Number of Diagnoses or Management Options  Diarrhea:   Nausea and vomiting:   Pneumonia:   Diagnosis management comments: 31 yo F presenting for evaluation of cough, nausea vomiting and diarrhea, pt labs mostly unremarkable, vitals are stable, pt did have L upper and lower lobe pneumonia, pt felt improvement of symptoms after zofran and reglan in the ED, she is asking to go home, did give one dose of doxycycline here since pt is asking to go home and will start on doxy PO, she is well appearing and otherwise healthy young female, currently afebrile, non toxic, no acute distress, f/u with pcp and advised to return as soon as possible if symptoms worsen    All labs and imaging discussed with patient, strict return to ED precautions discussed  Pt verbalizes understanding and agrees with plan  Pt is stable for discharge    Portions of the record may have been created with voice recognition software  Occasional wrong word or "sound a like" substitutions may have occurred due to the inherent limitations of voice recognition software  Read the chart carefully and recognize, using context, where substitutions have occurred  Disposition  Final diagnoses:   Pneumonia - and left lower lobe   Nausea and vomiting   Diarrhea     Time reflects when diagnosis was documented in both MDM as applicable and the Disposition within this note     Time User Action Codes Description Comment    11/6/2019  8:56 PM Shawna Resendez Add [J18 9] Pneumonia     11/6/2019  8:56 PM Shawna Resendez Modify [J18 9] Pneumonia and left lower lobe    11/6/2019  8:57 PM Spero Bharati C Add [R11 2] Nausea and vomiting     11/6/2019  8:57 PM Shawna Resendez Add [R19 7] Diarrhea       ED Disposition     ED Disposition Condition Date/Time Comment    Discharge Stable Wed Nov 6, 2019  8:56 PM [address] [place] [address] discharge to home/self care              Follow-up Information     Follow up With Specialties Details Why Contact Info Additional [address] [place] [address] Family Medicine Call in 1 day  [address], [address] [facility] [phone]  [address], [address], [address], [facility] [place], [address] [place]          Discharge Medication List as of 11/6/2019  8:58 PM      START taking these medications    Details   albuterol (PROVENTIL HFA,VENTOLIN HFA) 90 mcg/act inhaler Inhale 2 puffs every 4 (four) hours as needed for wheezing, Starting Wed 11/6/2019, Print      doxycycline hyclate (VIBRAMYCIN) 100 mg capsule Take 1 capsule (100 mg total) by mouth 2 (two) times a day for 10 days, Starting Wed 11/6/2019, Until Sat 11/16/2019, Print      ondansetron (ZOFRAN-ODT) 4 mg disintegrating tablet Take 1 tablet (4 mg total) by mouth every 6 (six) hours as needed for nausea or vomiting, Starting Wed 11/6/2019, Print      promethazine-codeine ([facility]) 6 25-10 mg/5 mL syrup Take 5 mL by mouth every 4 (four) hours as needed for cough for up to 10 days, Starting Wed 11/6/2019, Until Sat 11/16/2019, Print         CONTINUE these medications which have NOT CHANGED    Details   ibuprofen (MOTRIN) 600 mg tablet Take 1 tablet (600 mg total) by mouth every 6 (six) hours as needed (pain), Starting Sat 11/17/2018, Print      methocarbamol (ROBAXIN) 500 mg tablet Take 1 tablet (500 mg total) by mouth 4 (four) times a day as needed for muscle spasms, Starting Mon 8/27/2018, Normal      SUMAtriptan (IMITREX) 50 mg tablet Take 1 tablet (50 mg total) by mouth once as needed for migraine for up to 1 dose, Starting u 10/25/2018, Normal      traZODone (DESYREL) 50 mg tablet Take 1 tablet (50 mg total) by mouth daily at bedtime, Starting Mon 9/17/2018, Normal      verapamil (CALAN) 80 mg tablet Take 1 tablet (80 mg total) by mouth 3 (three) times a day, Starting Mon 8/27/2018, Normal           No discharge procedures on file      ED Provider  Electronically Signed by           Karina Villavicencio PA-C  11/06/19 8363

## 2020-11-17 ENCOUNTER — APPOINTMENT (EMERGENCY)
Dept: RADIOLOGY | Facility: HOSPITAL | Age: 30
End: 2020-11-17
Payer: COMMERCIAL

## 2020-11-17 ENCOUNTER — HOSPITAL ENCOUNTER (EMERGENCY)
Facility: HOSPITAL | Age: 30
Discharge: HOME | End: 2020-11-17
Attending: EMERGENCY MEDICINE
Payer: COMMERCIAL

## 2020-11-17 VITALS
BODY MASS INDEX: 40.45 KG/M2 | DIASTOLIC BLOOD PRESSURE: 91 MMHG | TEMPERATURE: 97 F | WEIGHT: 214.07 LBS | SYSTOLIC BLOOD PRESSURE: 147 MMHG | RESPIRATION RATE: 16 BRPM | HEART RATE: 74 BPM | OXYGEN SATURATION: 100 %

## 2020-11-17 DIAGNOSIS — M54.16 LUMBAR RADICULOPATHY: ICD-10-CM
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
## 2020-11-17 DIAGNOSIS — M54.9 BACK PAIN: ICD-10-CM

## 2020-11-17 LAB
BILIRUB UR QL STRIP: NEGATIVE
CLARITY UR: CLEAR
COLOR UR: YELLOW
EXT PREG TEST URINE: NEGATIVE
EXT. CONTROL ED NAV: NORMAL
GLUCOSE UR STRIP-MCNC: NEGATIVE MG/DL
HGB UR QL STRIP.AUTO: NEGATIVE
KETONES UR STRIP-MCNC: NEGATIVE MG/DL
LEUKOCYTE ESTERASE UR QL STRIP: NEGATIVE
NITRITE UR QL STRIP: NEGATIVE
PH UR STRIP.AUTO: 6.5 [PH]
PROT UR STRIP-MCNC: NEGATIVE MG/DL
SP GR UR STRIP.AUTO: 1.01 (ref 1–1.04)
UROBILINOGEN UA: NEGATIVE MG/DL

## 2020-11-17 PROCEDURE — 81025 URINE PREGNANCY TEST: CPT | Performed by: EMERGENCY MEDICINE

## 2020-11-17 PROCEDURE — 99284 EMERGENCY DEPT VISIT MOD MDM: CPT

## 2020-11-17 PROCEDURE — 72100 X-RAY EXAM L-S SPINE 2/3 VWS: CPT

## 2020-11-17 PROCEDURE — 99284 EMERGENCY DEPT VISIT MOD MDM: CPT | Performed by: EMERGENCY MEDICINE

## 2020-11-17 PROCEDURE — 96372 THER/PROPH/DIAG INJ SC/IM: CPT

## 2020-11-17 PROCEDURE — 51798 US URINE CAPACITY MEASURE: CPT

## 2020-11-17 RX ORDER — KETOROLAC TROMETHAMINE 30 MG/ML
30 INJECTION, SOLUTION INTRAMUSCULAR; INTRAVENOUS ONCE
Status: COMPLETED | OUTPATIENT
Start: 2020-11-17 | End: 2020-11-17

## 2020-11-17 RX ORDER — LIDOCAINE 50 MG/G
1 PATCH TOPICAL DAILY
Qty: 4 PATCH | Refills: 0 | Status: SHIPPED | OUTPATIENT
Start: 2020-11-17 | End: 2020-11-21

## 2020-11-17 RX ORDER — CYCLOBENZAPRINE HCL 10 MG
10 TABLET ORAL 3 TIMES DAILY PRN
Qty: 12 TABLET | Refills: 0 | Status: SHIPPED | OUTPATIENT
Start: 2020-11-17 | End: 2020-11-21

## 2020-11-17 RX ORDER — ACETAMINOPHEN 325 MG/1
650 TABLET ORAL ONCE
Status: COMPLETED | OUTPATIENT
Start: 2020-11-17 | End: 2020-11-17

## 2020-11-17 RX ORDER — LIDOCAINE 50 MG/G
1 PATCH TOPICAL ONCE
Status: DISCONTINUED | OUTPATIENT
Start: 2020-11-17 | End: 2020-11-17 | Stop reason: HOSPADM

## 2020-11-17 RX ORDER — NAPROXEN 500 MG/1
500 TABLET ORAL 2 TIMES DAILY WITH MEALS
Qty: 8 TABLET | Refills: 0 | Status: SHIPPED | OUTPATIENT
Start: 2020-11-17 | End: 2020-11-21

## 2020-11-17 RX ORDER — CYCLOBENZAPRINE HCL 10 MG
10 TABLET ORAL ONCE
Status: COMPLETED | OUTPATIENT
Start: 2020-11-17 | End: 2020-11-17

## 2020-11-17 RX ADMIN — CYCLOBENZAPRINE HYDROCHLORIDE 10 MG: 10 TABLET, FILM COATED ORAL at 15:27

## 2020-11-17 RX ADMIN — ACETAMINOPHEN 650 MG: 325 TABLET ORAL at 15:27

## 2020-11-17 RX ADMIN — LIDOCAINE 1 PATCH: 50 PATCH TOPICAL at 15:27

## 2020-11-17 RX ADMIN — KETOROLAC TROMETHAMINE 30 MG: 30 INJECTION, SOLUTION INTRAMUSCULAR; INTRAVENOUS at 15:27
